# Patient Record
Sex: FEMALE | Race: WHITE | NOT HISPANIC OR LATINO | Employment: FULL TIME | URBAN - METROPOLITAN AREA
[De-identification: names, ages, dates, MRNs, and addresses within clinical notes are randomized per-mention and may not be internally consistent; named-entity substitution may affect disease eponyms.]

---

## 2018-09-05 ENCOUNTER — LAB (OUTPATIENT)
Dept: LAB | Facility: HOSPITAL | Age: 63
End: 2018-09-05

## 2018-09-05 ENCOUNTER — OFFICE VISIT (OUTPATIENT)
Dept: FAMILY MEDICINE CLINIC | Facility: CLINIC | Age: 63
End: 2018-09-05

## 2018-09-05 VITALS
DIASTOLIC BLOOD PRESSURE: 80 MMHG | BODY MASS INDEX: 26.77 KG/M2 | HEART RATE: 70 BPM | HEIGHT: 70 IN | SYSTOLIC BLOOD PRESSURE: 128 MMHG | OXYGEN SATURATION: 97 % | WEIGHT: 187 LBS

## 2018-09-05 DIAGNOSIS — Z83.2 FAMILY HISTORY OF AUTOIMMUNE DISORDER: ICD-10-CM

## 2018-09-05 DIAGNOSIS — D35.1 PARATHYROID ADENOMA: ICD-10-CM

## 2018-09-05 DIAGNOSIS — M25.512 ACUTE PAIN OF LEFT SHOULDER: ICD-10-CM

## 2018-09-05 DIAGNOSIS — D35.1 PARATHYROID ADENOMA: Primary | ICD-10-CM

## 2018-09-05 PROBLEM — R29.898 WEAKNESS OF SHOULDER: Status: ACTIVE | Noted: 2018-08-30

## 2018-09-05 PROBLEM — M25.519 SHOULDER PAIN: Status: ACTIVE | Noted: 2018-08-30

## 2018-09-05 PROBLEM — M75.02 ADHESIVE CAPSULITIS OF LEFT SHOULDER: Status: ACTIVE | Noted: 2018-08-30

## 2018-09-05 LAB
ALBUMIN SERPL-MCNC: 4.54 G/DL (ref 3.2–4.8)
ALBUMIN/GLOB SERPL: 1.8 G/DL (ref 1.5–2.5)
ALP SERPL-CCNC: 84 U/L (ref 25–100)
ALT SERPL W P-5'-P-CCNC: 19 U/L (ref 7–40)
ANION GAP SERPL CALCULATED.3IONS-SCNC: 7 MMOL/L (ref 3–11)
AST SERPL-CCNC: 12 U/L (ref 0–33)
BASOPHILS # BLD AUTO: 0.04 10*3/MM3 (ref 0–0.2)
BASOPHILS NFR BLD AUTO: 0.4 % (ref 0–1)
BILIRUB SERPL-MCNC: 0.5 MG/DL (ref 0.3–1.2)
BUN BLD-MCNC: 12 MG/DL (ref 9–23)
BUN/CREAT SERPL: 15.4 (ref 7–25)
CA-I SERPL ISE-MCNC: 1.33 MMOL/L (ref 1.12–1.32)
CALCIUM SPEC-SCNC: 9.3 MG/DL (ref 8.7–10.4)
CHLORIDE SERPL-SCNC: 105 MMOL/L (ref 99–109)
CO2 SERPL-SCNC: 29 MMOL/L (ref 20–31)
CREAT BLD-MCNC: 0.78 MG/DL (ref 0.6–1.3)
CRP SERPL-MCNC: 0.14 MG/DL (ref 0–1)
DEPRECATED RDW RBC AUTO: 43.6 FL (ref 37–54)
EOSINOPHIL # BLD AUTO: 0.21 10*3/MM3 (ref 0–0.3)
EOSINOPHIL NFR BLD AUTO: 2.4 % (ref 0–3)
ERYTHROCYTE [DISTWIDTH] IN BLOOD BY AUTOMATED COUNT: 13.4 % (ref 11.3–14.5)
ERYTHROCYTE [SEDIMENTATION RATE] IN BLOOD: 19 MM/HR (ref 0–30)
GFR SERPL CREATININE-BSD FRML MDRD: 75 ML/MIN/1.73
GLOBULIN UR ELPH-MCNC: 2.6 GM/DL
GLUCOSE BLD-MCNC: 106 MG/DL (ref 70–100)
HCT VFR BLD AUTO: 41.4 % (ref 34.5–44)
HGB BLD-MCNC: 13.7 G/DL (ref 11.5–15.5)
IMM GRANULOCYTES # BLD: 0.02 10*3/MM3 (ref 0–0.03)
IMM GRANULOCYTES NFR BLD: 0.2 % (ref 0–0.6)
LYMPHOCYTES # BLD AUTO: 2.95 10*3/MM3 (ref 0.6–4.8)
LYMPHOCYTES NFR BLD AUTO: 33 % (ref 24–44)
MCH RBC QN AUTO: 29.5 PG (ref 27–31)
MCHC RBC AUTO-ENTMCNC: 33.1 G/DL (ref 32–36)
MCV RBC AUTO: 89.2 FL (ref 80–99)
MONOCYTES # BLD AUTO: 0.65 10*3/MM3 (ref 0–1)
MONOCYTES NFR BLD AUTO: 7.3 % (ref 0–12)
NEUTROPHILS # BLD AUTO: 5.08 10*3/MM3 (ref 1.5–8.3)
NEUTROPHILS NFR BLD AUTO: 56.9 % (ref 41–71)
PLATELET # BLD AUTO: 361 10*3/MM3 (ref 150–450)
PMV BLD AUTO: 9.8 FL (ref 6–12)
POTASSIUM BLD-SCNC: 3.8 MMOL/L (ref 3.5–5.5)
PROT SERPL-MCNC: 7.1 G/DL (ref 5.7–8.2)
PTH-INTACT SERPL-MCNC: 57 PG/ML (ref 11–80)
RBC # BLD AUTO: 4.64 10*6/MM3 (ref 3.89–5.14)
SODIUM BLD-SCNC: 141 MMOL/L (ref 132–146)
WBC NRBC COR # BLD: 8.93 10*3/MM3 (ref 3.5–10.8)

## 2018-09-05 PROCEDURE — 85025 COMPLETE CBC W/AUTO DIFF WBC: CPT

## 2018-09-05 PROCEDURE — 99204 OFFICE O/P NEW MOD 45 MIN: CPT | Performed by: FAMILY MEDICINE

## 2018-09-05 PROCEDURE — 83970 ASSAY OF PARATHORMONE: CPT

## 2018-09-05 PROCEDURE — 86225 DNA ANTIBODY NATIVE: CPT

## 2018-09-05 PROCEDURE — 86140 C-REACTIVE PROTEIN: CPT

## 2018-09-05 PROCEDURE — 86431 RHEUMATOID FACTOR QUANT: CPT

## 2018-09-05 PROCEDURE — 80053 COMPREHEN METABOLIC PANEL: CPT

## 2018-09-05 PROCEDURE — 86235 NUCLEAR ANTIGEN ANTIBODY: CPT

## 2018-09-05 PROCEDURE — 36415 COLL VENOUS BLD VENIPUNCTURE: CPT

## 2018-09-05 PROCEDURE — 82330 ASSAY OF CALCIUM: CPT

## 2018-09-05 PROCEDURE — 86430 RHEUMATOID FACTOR TEST QUAL: CPT

## 2018-09-05 RX ORDER — ACYCLOVIR 200 MG/1
CAPSULE ORAL
COMMUNITY
Start: 2018-06-12 | End: 2021-12-15

## 2018-09-05 RX ORDER — SUMATRIPTAN 100 MG/1
100 TABLET, FILM COATED ORAL
COMMUNITY

## 2018-09-05 NOTE — PROGRESS NOTES
Shimon Reinoso presents today to establish care.    Chief Complaint   Patient presents with   • Establish Care     autoimmune check up, parathyroid tumor check, L shoulder pain         HPI   Parathyroid adenoma:  1 adenoma removed in 2011. Had joint pain for a long time, calcium was a little high. Saw rheumatologist and diagnosed osteoarthritis. Got a second opinion, found parathyroid tumor. Had nuclear scan done for hot spot. Dr. Ibrahima DEL REAL did surgery.     Shoulder pain:  Recently sister's have had positive WILLY. Past couple months pain in left shoulder. Goes to the gym, but having decreased ROM. Saw orthopedics last week and diagnosed frozen shoulder and autoimmune disease. Ordered PT, ice, antiinflammatory medication.     Hyperlipidemia:  Labs drawn at work this Spring and were ok. LDL and HDL bad but total cholesterol ok. Not currently on statin.     H/o blood clot:  2009 left greater saphenous vein. Due to Mima OCP. Took coumadin for 6 months.     Osteoporosis:  Around 10 years ago in right hip. Decided not to take medication due to risks/side effects.     Review of Systems   Constitutional: Negative.    HENT: Negative.    Eyes: Negative.    Respiratory: Negative.    Cardiovascular: Positive for leg swelling.   Gastrointestinal: Negative.    Endocrine: Negative.    Genitourinary: Negative.    Musculoskeletal: Positive for arthralgias and joint swelling.   Skin: Negative.    Neurological: Positive for headache.        Tingling BLE   Hematological: Negative.    Psychiatric/Behavioral: Negative.         Past Medical History:   Diagnosis Date   • Colon polyp    • History of blood clots 2009    left great saphenous, due to Mima OCP, coumadin x 6 months   • Hyperlipidemia    • Infectious viral hepatitis     A as a child    • Migraine headache    • Osteoporosis         Past Surgical History:   Procedure Laterality Date   • PARATHYROIDECTOMY  2011    adenoma        Family History   Problem Relation Age of Onset   •  "Hypertension Mother    • Hyperlipidemia Mother    • Migraines Mother    • Multiple myeloma Mother    • Arthritis Father    • Heart disease Father    • Hypertension Sister    • Autoimmune disease Sister         WILLY positive   • Heart attack Maternal Uncle    • Heart attack Paternal Uncle         Social History     Social History   • Marital status:      Spouse name: N/A   • Number of children: N/A   • Years of education: N/A     Occupational History   • nurse      [employer]     Social History Main Topics   • Smoking status: Never Smoker   • Smokeless tobacco: Never Used   • Alcohol use No   • Drug use: No   • Sexual activity: Defer     Other Topics Concern   • Not on file     Social History Narrative   • No narrative on file        Current Outpatient Prescriptions   Medication Sig Dispense Refill   • acyclovir (ZOVIRAX) 200 MG capsule PRN     • SUMAtriptan (IMITREX) 100 MG tablet Take 100 mg by mouth Every 2 (Two) Hours As Needed for Migraine.       No current facility-administered medications for this visit.        No Known Allergies     Visit Vitals  /80 (BP Location: Left arm, Patient Position: Sitting, Cuff Size: Adult)   Pulse 70   Ht 177.8 cm (70\")   Wt 84.8 kg (187 lb)   SpO2 97%   BMI 26.83 kg/m²        Physical Exam   Constitutional: She is oriented to person, place, and time. No distress.   HENT:   Nose: Nose normal.   Mouth/Throat: Oropharynx is clear and moist.   Eyes: Pupils are equal, round, and reactive to light. Conjunctivae are normal.   Neck: Neck supple. No thyromegaly present.   Cardiovascular: Normal rate, regular rhythm and intact distal pulses.    No murmur heard.  Pulses:       Posterior tibial pulses are 2+ on the right side, and 2+ on the left side.   Pulmonary/Chest: Effort normal and breath sounds normal.   Abdominal: Soft. There is no hepatosplenomegaly. There is no tenderness.   Musculoskeletal: She exhibits no edema.        Left shoulder: She exhibits decreased " range of motion.   Lymphadenopathy:     She has no cervical adenopathy.   Neurological: She is alert and oriented to person, place, and time.   Skin: Skin is warm and dry. No rash noted.   Psychiatric: She has a normal mood and affect. Her behavior is normal. Judgment and thought content normal.   Vitals reviewed.       No results found for this or any previous visit.     Shimon was seen today for establish care.    Diagnoses and all orders for this visit:  Prior PCP records requested.  Prior GYN records and Pap requested.  Parathyroid adenoma  -     Comprehensive Metabolic Panel; Future  -     Calcium, Ionized; Future  -     PTH, Intact; Future  Prior records requested.  Check labs today.  Family history of autoimmune disorder  -     CBC & Differential; Future  -     WILLY Comprehensive Panel; Future  -     Rheumatoid Factor; Future  -     Sedimentation rate, automated; Future  -     C-reactive protein; Future  Family history Sr. positive WILLY.  Patient is experiencing joint pain as well and orthopedic recommended autoimmune workup, labs ordered today.   Acute pain of left shoulder  -     CBC & Differential; Future  -     WILLY Comprehensive Panel; Future  -     Rheumatoid Factor; Future  -     Sedimentation rate, automated; Future  -     C-reactive protein; Future  Patient is under the care of orthopedics.  He recommended autoimmune workup, labs ordered as above.  Request prior records.      Return for If not improved.

## 2018-09-06 LAB
RHEUMATOID FACT SERPL-ACNC: POSITIVE [IU]/ML
RHEUMATOID FACT TITR SER: NORMAL IU/ML

## 2018-09-07 DIAGNOSIS — R76.8 RHEUMATOID FACTOR POSITIVE: Primary | ICD-10-CM

## 2018-09-07 LAB
CENTROMERE B AB SER-ACNC: <0.2 AI (ref 0–0.9)
CHROMATIN AB SERPL-ACNC: <0.2 AI (ref 0–0.9)
DSDNA AB SER-ACNC: <1 IU/ML (ref 0–9)
ENA JO1 AB SER-ACNC: <0.2 AI (ref 0–0.9)
ENA RNP AB SER-ACNC: 0.4 AI (ref 0–0.9)
ENA SCL70 AB SER-ACNC: <0.2 AI (ref 0–0.9)
ENA SM AB SER-ACNC: <0.2 AI (ref 0–0.9)
ENA SS-A AB SER-ACNC: <0.2 AI (ref 0–0.9)
ENA SS-B AB SER-ACNC: <0.2 AI (ref 0–0.9)
Lab: NORMAL

## 2018-09-10 ENCOUNTER — TELEPHONE (OUTPATIENT)
Dept: FAMILY MEDICINE CLINIC | Facility: CLINIC | Age: 63
End: 2018-09-10

## 2018-09-10 NOTE — TELEPHONE ENCOUNTER
WILLY Comprehensive Panel   Order: [id number]   Status:  Final result   Visible to patient:  Yes (MyChart) Dx:  Family history of autoimmune disorder...   Notes recorded by Radha Montez MA on 9/10/2018 at 11:31 AM EDT  LVM for pt to call back.  ------    Notes recorded by Jazmyne Gordon MD on 9/7/2018 at 1:54 PM EDT  The test results show positive for rheumatoid factor.  I recommend further evaluation with a rheumatologist and I have placed a referral.  Your ionized calcium was slightly out of range and normal calcium on the CMP.  PTH is in the normal range

## 2020-05-12 ENCOUNTER — OFFICE VISIT (OUTPATIENT)
Dept: PREADMISSION TESTING | Facility: HOSPITAL | Age: 65
End: 2020-05-12

## 2020-05-12 PROCEDURE — U0002 COVID-19 LAB TEST NON-CDC: HCPCS | Performed by: INTERNAL MEDICINE

## 2020-05-12 PROCEDURE — U0004 COV-19 TEST NON-CDC HGH THRU: HCPCS | Performed by: INTERNAL MEDICINE

## 2020-05-13 LAB
REF LAB TEST METHOD: NORMAL
SARS-COV-2 RNA RESP QL NAA+PROBE: NOT DETECTED

## 2020-05-14 ENCOUNTER — OUTSIDE FACILITY SERVICE (OUTPATIENT)
Dept: GASTROENTEROLOGY | Facility: CLINIC | Age: 65
End: 2020-05-14

## 2020-05-14 PROCEDURE — 45388 COLONOSCOPY W/ABLATION: CPT | Performed by: INTERNAL MEDICINE

## 2021-08-10 ENCOUNTER — TRANSCRIBE ORDERS (OUTPATIENT)
Dept: PHYSICAL THERAPY | Facility: CLINIC | Age: 66
End: 2021-08-10

## 2021-08-10 DIAGNOSIS — M53.3 CHRONIC LEFT SI JOINT PAIN: ICD-10-CM

## 2021-08-10 DIAGNOSIS — G89.29 CHRONIC LEFT SI JOINT PAIN: ICD-10-CM

## 2021-08-10 DIAGNOSIS — M54.50 LUMBOSACRAL PAIN: Primary | ICD-10-CM

## 2021-08-10 DIAGNOSIS — M54.32 LEFT SIDED SCIATICA: ICD-10-CM

## 2021-08-19 ENCOUNTER — TREATMENT (OUTPATIENT)
Dept: PHYSICAL THERAPY | Facility: CLINIC | Age: 66
End: 2021-08-19

## 2021-08-19 DIAGNOSIS — M54.42 ACUTE LEFT-SIDED LOW BACK PAIN WITH LEFT-SIDED SCIATICA: Primary | ICD-10-CM

## 2021-08-19 PROCEDURE — 97140 MANUAL THERAPY 1/> REGIONS: CPT | Performed by: PHYSICAL THERAPIST

## 2021-08-19 PROCEDURE — 97162 PT EVAL MOD COMPLEX 30 MIN: CPT | Performed by: PHYSICAL THERAPIST

## 2021-08-19 PROCEDURE — 97110 THERAPEUTIC EXERCISES: CPT | Performed by: PHYSICAL THERAPIST

## 2021-08-19 PROCEDURE — 97112 NEUROMUSCULAR REEDUCATION: CPT | Performed by: PHYSICAL THERAPIST

## 2021-08-19 NOTE — PROGRESS NOTES
Physical Therapy Initial Evaluation and Plan of Care    TOTAL TIME: 75 MINUTES    Subjective Evaluation    History of Present Illness  Date of onset: 2021  Mechanism of injury: Works as Director of Nursing at [employer]    300+ pound man that was in a car, was helping 2 men get him out; got in back seat and was trying to help push him out from behind; then got out of car and helped him transfer; 'he was dead weight'     Injured during the pull/twist part of the transfer; felt burning ; started hurting in left hip and leg down to the knee ; toes were numb intermittently ; not having the spasms in the back anymore     Had State [employer]ors in the building that week, so could not come and see the doctor; by Friday night it had gotten really bad when trying to sleep    Got Mobic and Flexeril (not taking anymore)      Patient Occupation: Director of Nursing Pain  Current pain ratin  Location: low back, left hip  Quality: dull ache and discomfort  Relieving factors: change in position, heat and medications  Aggravating factors: movement, lifting, prolonged positioning, standing, ambulation, squatting, sleeping and stairs    Patient Goals  Patient goals for therapy: increased motion, decreased pain, increased strength, independence with ADLs/IADLs, return to sport/leisure activities and return to work             Objective        Special Questions      Additional Special Questions  Denies red flags      Postural Observations  Seated posture: fair  Standing posture: fair  Correction of posture: has no consistent effect    Additional Postural Observation Details  'pain is constant' in left PSIS area, buttocks, side of left hip  Patient denies directional preference  Pain with extended time in all positions    Palpation   Left   Tenderness of the lumbar paraspinals and quadratus lumborum.     Additional Palpation Details  TTP lower left thoracic paraspinals      Tenderness     Lumbar Spine  Tenderness in  "the facet joint. No tenderness in the spinous process.     Left Hip   Tenderness in the PSIS. No tenderness in the ASIS.     Right Hip   No tenderness in the ASIS and PSIS.     Neurological Testing     Sensation     Lumbar   Left   Intact: light touch    Right   Intact: light touch    Reflexes   Left   Patellar (L4): normal (2+)  Achilles (S1): normal (2+)    Right   Patellar (L4): normal (2+)  Achilles (S1): normal (2+)    Active Range of Motion     Lumbar   Flexion: Active lumbar flexion: 6\" from floor, pain upon extending back up. WFL and with pain  Extension: WFL  Left lateral flexion: WFL  Right lateral flexion: WFL  Left rotation: WFL  Right rotation: WFL    Additional Active Range of Motion Details  No increase from the constant 4/10 pain she has at rest    Strength/Myotome Testing     Lumbar   Left   Normal strength    Right   Normal strength    Tests     Lumbar     Left   Negative passive SLR and quadrant.     Right   Negative passive SLR and quadrant.     Left Pelvic Girdle/Sacrum   Negative: sacrum compression, gapping and sacral spring.     Right Pelvic Girdle/Sacrum   Negative: sacrum compression, gapping and sacral spring.     Additional Tests Details  No pelvic asymmetry noted     + Mary's left side           Assessment & Plan     Assessment  Impairments: abnormal or restricted ROM, activity intolerance, lacks appropriate home exercise program and pain with function  Assessment details: 66 yo presents with s/s of left lumbar strain after a lift / twist injury in which she was assisting to transfer a heavy patient from car to wheelchair ; she has improved since the time of injury; states she had a lot of spasms initially but has improved; she has complaints of constant, moderate pain in left lower back and SI joints; no directional preference noted today; initiated HEP for lumbar mobility / flexibility ; should respond well to PT for manual therapy, modalities prn and therapeutic exercise to improve " functional mobility and endurance in order to RTW on full duty without pain or dysfunction  Prognosis: fair  Functional Limitations: carrying objects, lifting, sleeping, walking, pulling, pushing, uncomfortable because of pain, moving in bed, sitting, standing and stooping  Goals  Plan Goals: 4 weeks:  1. IND with HEP  2. Patient to display pain-free ROM of lumbar spine without compensation  3. Patient to improve TAMIA by 1 MCID  4. Patient to c/o pain no > 2/10 at worst and no tenderness to palpate    Plan  Therapy options: will be seen for skilled physical therapy services  Planned modality interventions: iontophoresis, TENS, thermotherapy (hydrocollator packs), traction, ultrasound, high voltage pulsed current (pain management), electrical stimulation/Russian stimulation, dry needling and cryotherapy  Planned therapy interventions: manual therapy, neuromuscular re-education, soft tissue mobilization, spinal/joint mobilization, strengthening, stretching, therapeutic activities, joint mobilization, home exercise program, functional ROM exercises, flexibility and body mechanics training  Frequency: 2x week  Duration in visits: 8  Treatment plan discussed with: patient         Access Code: [email or web address]  URL: [email or web address]  Date: 08/19/2021  Prepared by: Pranav Ferraro    Exercises  Supine Lower Trunk Rotation - 2 x daily - 7 x weekly - 3 sets - 10 reps  Supine Posterior Pelvic Tilt - 2 x daily - 7 x weekly - 3 sets - 10 reps  Supine Piriformis Stretch with Foot on Ground - 2 x daily - 7 x weekly - 1 sets - 10 reps - 10 seconds hold  Supine Single Knee to Chest Stretch - 2 x daily - 7 x weekly - 1 sets - 10 reps - 10 second hold  Supine Lumbar Rotation Stretch - 2 x daily - 7 x weekly - 1 sets - 10 reps - 10 seconds hold  Prone on Elbows Stretch - 2 x daily - 7 x weekly - 1 sets - 10 reps - 10 seconds hold  Seated Quadratus Lumborum Stretch with Arm Overhead - 2 x daily - 7 x weekly - 1 sets - 10 reps - 10  seconds hold  Standing Quadratus Lumborum Stretch with Doorway - 2 x daily - 7 x weekly - 1 sets - 10 reps - 10 seconds hold    Manual Therapy:    10     mins  18958;  Therapeutic Exercise:    15     mins  39050;     Neuromuscular Baljinder:    10    mins  32780;    Therapeutic Activity:          mins  20731;     Gait Training:           mins  18855;     Ultrasound:          mins  85363;    Electrical Stimulation:    15     mins  11922 ( );  Dry Needling          mins self-pay    Timed Treatment:   50   mins   Total Treatment:     75   mins    PT SIGNATURE: GERONIMO eFrraro, PT   DATE TREATMENT INITIATED: 8/19/2021    Initial Certification  Certification Period: 11/17/2021  I certify that the therapy services are furnished while this patient is under my care.  The services outlined above are required by this patient, and will be reviewed every 90 days.     PHYSICIAN: Dudley Dela Cruz MD      DATE:     Please sign and return via fax to  .. Thank you, [facility].

## 2021-08-26 ENCOUNTER — TREATMENT (OUTPATIENT)
Dept: PHYSICAL THERAPY | Facility: CLINIC | Age: 66
End: 2021-08-26

## 2021-08-26 DIAGNOSIS — M54.42 ACUTE LEFT-SIDED LOW BACK PAIN WITH LEFT-SIDED SCIATICA: Primary | ICD-10-CM

## 2021-08-26 PROCEDURE — 97112 NEUROMUSCULAR REEDUCATION: CPT | Performed by: PHYSICAL THERAPIST

## 2021-08-26 PROCEDURE — 97140 MANUAL THERAPY 1/> REGIONS: CPT | Performed by: PHYSICAL THERAPIST

## 2021-08-26 PROCEDURE — 97014 ELECTRIC STIMULATION THERAPY: CPT | Performed by: PHYSICAL THERAPIST

## 2021-08-26 PROCEDURE — 97110 THERAPEUTIC EXERCISES: CPT | Performed by: PHYSICAL THERAPIST

## 2021-08-26 NOTE — PROGRESS NOTES
Physical Therapy Daily Progress Note    TOTAL TIME: 60 MINUTES    Shimon Reinoso reports: thoracic and lumbar spine doing better, pain is mostly now in my left hip and down my outer thigh, feels deep      Objective   See Exercise, Manual, and Modality Logs for complete treatment.     THERAPEUTIC EXERCISES/ACTIVITIES ADDED TODAY: sidelying over bolster for positional traction, supine clams with green tband strap, bridges, piriformis stx; see previous hep    Tenderness to palpate: left greater trochanter, glute medius, proximal ITB    Manual: side lying gapping over bolster with QL stx, foam roller to left lateral thigh, piriformis, glute med    ROM: improved L/T AROM to WFL all planes without pain    IFC estim to left hip/lateral thigh, with moist heat, in right side lying position x 15 minutes    Assessment/Plan  Patient has improved since the initial evaluation; improved ROM, no tenderness to palpate the L/T spine or p/s mm; pain primarily located in left lateral hip; pain with some ER passive stx, tolerated loading ER's with clams without pain; may benefit from DN to lateral hip at next visit if still painful    Progress per Plan of Care           Manual Therapy:    15     mins  70236;  Therapeutic Exercise:    15     mins  08052;     Neuromuscular Baljinder:    15    mins  99733;    Therapeutic Activity:          mins  46438;     Gait Training:           mins  65596;     Ultrasound:          mins  61423;    Electrical Stimulation:    15     mins  44443 ( );  Dry Needling          mins self-pay    Timed Treatment:   60   mins   Total Treatment:     60   mins    GERONIMO Ferraro, PT  Physical Therapist  
115

## 2021-09-02 ENCOUNTER — TELEPHONE (OUTPATIENT)
Dept: PHYSICAL THERAPY | Facility: CLINIC | Age: 66
End: 2021-09-02

## 2021-12-07 ENCOUNTER — TRANSCRIBE ORDERS (OUTPATIENT)
Dept: ADMINISTRATIVE | Facility: HOSPITAL | Age: 66
End: 2021-12-07

## 2021-12-07 DIAGNOSIS — R07.9 CHEST PAIN, UNSPECIFIED TYPE: Primary | ICD-10-CM

## 2021-12-15 ENCOUNTER — OFFICE VISIT (OUTPATIENT)
Dept: CARDIOLOGY | Facility: CLINIC | Age: 66
End: 2021-12-15

## 2021-12-15 VITALS
DIASTOLIC BLOOD PRESSURE: 78 MMHG | OXYGEN SATURATION: 99 % | WEIGHT: 189 LBS | SYSTOLIC BLOOD PRESSURE: 112 MMHG | HEART RATE: 63 BPM | HEIGHT: 71 IN | BODY MASS INDEX: 26.46 KG/M2

## 2021-12-15 DIAGNOSIS — R07.89 CHEST PAIN, ATYPICAL: Primary | ICD-10-CM

## 2021-12-15 DIAGNOSIS — R94.31 ABNORMAL EKG: ICD-10-CM

## 2021-12-15 DIAGNOSIS — R06.02 SOB (SHORTNESS OF BREATH): ICD-10-CM

## 2021-12-15 DIAGNOSIS — E78.2 MIXED HYPERLIPIDEMIA: ICD-10-CM

## 2021-12-15 PROCEDURE — 99203 OFFICE O/P NEW LOW 30 MIN: CPT | Performed by: PHYSICIAN ASSISTANT

## 2021-12-15 PROCEDURE — 93000 ELECTROCARDIOGRAM COMPLETE: CPT | Performed by: PHYSICIAN ASSISTANT

## 2021-12-15 RX ORDER — TURMERIC ROOT EXTRACT 500 MG
500 TABLET ORAL DAILY
COMMUNITY

## 2021-12-15 RX ORDER — MULTIPLE VITAMINS W/ MINERALS TAB 9MG-400MCG
1 TAB ORAL DAILY
COMMUNITY

## 2021-12-15 RX ORDER — GINKGO BILOBA LEAF EXTRACT 60 MG
100 CAPSULE ORAL DAILY
COMMUNITY

## 2021-12-15 RX ORDER — CHLORAL HYDRATE 500 MG
CAPSULE ORAL
COMMUNITY

## 2021-12-15 RX ORDER — OMEPRAZOLE 40 MG/1
40 CAPSULE, DELAYED RELEASE ORAL AS NEEDED
COMMUNITY

## 2021-12-15 RX ORDER — MELATONIN
1000 DAILY
COMMUNITY

## 2021-12-15 NOTE — PROGRESS NOTES
[facility]  INITIAL OFFICE CONSULT      Shimon Reinoso  1955  PCP: Jazmyne Gordon MD    SUBJECTIVE:   Shimon Reinoso is a 66 y.o. female seen for a consultation visit regarding the following:     Chief Complaint:   Chief Complaint   Patient presents with   • Chest Pain   • Irregular Heart Beat        Consultation is requested by Guillaume Donaldson MD for evaluation of Chest Pain and Irregular Heart Beat      History:  Pleasant 66-year-old female presents today for consultation at the request of her primary care provider regarding chest pain.  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  In her late 20s she was diagnosed pericarditis by Dr. Guillaume Pina.  She also had atypical chest pain dating back she thinks about 2012- 2013 she is admitted to [employer] [facility] for 3-4 stay with the extensive work-up for her chest pain including negative CT of the chest, negative stress test and normal echocardiogram.  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  She still works a significant amount but is less active she does not exercise on regular basis she is gained some weight.  She has not any difficulties hypertension she does have dyslipidemia but has not started a statin.  She does have a strong family history of heart disease.  Recently the past several months she has had a return of her chest pain.  Somewhat different than previous chest pain this is more of a substernal region just above her epigastric area symptoms radiates through to her back.  This is not exertional in nature.  Last about 15 minutes.  Is not associatea any other concomitant symptoms such as nausea, diaphoresis dizziness near syncope or syncope.  He does have occasional palpitations but these are not overly bothersome to her.  She recently lost a close friend to cardiac arrest she has some  significant stress anxiety with work and upcoming intermediate.  She feels some this may be causing some of her chest pain but she would like to have further cardiac evaluation to rule out this possibly due to her heart.      Cardiac PMH: (Old records have been reviewed and summarized below)  1. Chest pain  a. Remote Pericarditis age 28  b. [facility] Hospitalization 2013-Negative CTA of chest, Negative Stress test and Echo.  c. Recurrent Chest pain, EKg with nonspecific ST-T wave abnormalities..   2. HLD  3. GERD-Prilosec  4. OA, Osteoporosis  5. Parathyroidectomy 2011      Past Medical History, Past Surgical History, Family history, Social History, and Medications were all reviewed with the patient today and updated as necessary.     Current Outpatient Medications   Medication Sig Dispense Refill   • cholecalciferol (VITAMIN D3) 25 MCG (1000 UT) tablet Take 1,000 Units by mouth Daily.     • Ginseng 100 MG capsule Take 100 mg by mouth Daily.     • multivitamin with minerals tablet tablet Take 1 tablet by mouth Daily.     • Omega-3 Fatty Acids (fish oil) 1000 MG capsule capsule Take  by mouth Daily With Breakfast.     • omeprazole (priLOSEC) 40 MG capsule Take 40 mg by mouth As Needed.     • SUMAtriptan (IMITREX) 100 MG tablet Take 100 mg by mouth Every 2 (Two) Hours As Needed for Migraine.     • Turmeric 500 MG tablet Take 500 mg by mouth Daily.       No current facility-administered medications for this visit.     No Known Allergies      Past Medical History:   Diagnosis Date   • Abnormal ECG    • Colon polyp    • Diverticulosis    • Frozen shoulder    • History of blood clots 2009    left great saphenous, due to Mima OCP, coumadin x 6 months   • Hyperlipidemia    • Infectious viral hepatitis     A as a child    • Injury of back    • Migraine headache    • Osteoporosis    • Rheumatoid factor positive      Past Surgical History:   Procedure Laterality Date   • PARATHYROIDECTOMY  2011    adenoma     Family History   Problem  "Relation Age of Onset   • Hypertension Mother    • Hyperlipidemia Mother    • Migraines Mother    • Multiple myeloma Mother    • Arthritis Father    • Heart disease Father    • Hypertension Sister    • Autoimmune disease Sister         WILLY positive   • Heart attack Maternal Uncle    • Heart attack Paternal Uncle      Social History     Tobacco Use   • Smoking status: Never Smoker   • Smokeless tobacco: Never Used   Substance Use Topics   • Alcohol use: Not Currently       ROS:  Review of Symptoms:  General: no recent weight loss/gain, weakness or fatigue  Skin: no rashes, lumps, or other skin changes  HEENT: no dizziness, lightheadedness, or vision changes  Respiratory: no cough or hemoptysis  Cardiovascular: no palpitations, and tachycardia  Gastrointestinal: no black/tarry stools or diarrhea  Urinary: no change in frequency or urgency  Peripheral Vascular: no claudication or leg cramps  Musculoskeletal: + Osteoarthritis  Psychiatric: no depression or excessive stress  Neurological: no sensory or motor loss, no syncope  Hematologic: no anemia, easy bruising or bleeding  Endocrine: no thyroid problems, nor heat or cold intolerance         PHYSICAL EXAM:   /78 (BP Location: Left arm, Patient Position: Sitting)   Pulse 63   Ht 180.3 cm (71\")   Wt 85.7 kg (189 lb)   SpO2 99%   BMI 26.36 kg/m²      Wt Readings from Last 5 Encounters:   12/15/21 85.7 kg (189 lb)   09/05/18 84.8 kg (187 lb)     BP Readings from Last 5 Encounters:   12/15/21 112/78   09/05/18 128/80       General-Well Nourished, Well developed  Eyes - PERRLA  Neck- supple, No mass  CV- regular rate and rhythm, no MRG  Lung- clear bilaterally  Abd- soft, +BS  Musc/skel - Norm strength and range of motion  Skin- warm and dry  Neuro - Alert & Oriented x 3, appropriate mood.    Patient's external notes were reviewed.  Independent interpretation of test performed by another physician in facility were reviewed.  Outside laboratory data was also " "reviewed.    Medical problems and test results were reviewed with the patient today.     Results for orders placed or performed in visit on 05/12/20   CORONAVIRUS (COVID-19),RT-PCR,[facility], NP SWAB IN [facility] - Swab, Nasopharynx    Specimen: Nasopharynx; Swab   Result Value Ref Range    Reference Lab Report      COVID19 Not Detected Not Detected - Ref. Range         No results found for: CHOL, HDL, HDLC, LDL, LDLC, VLDL    EKG:  (EKG/Tracing has been independently visualized by me and summarized below)      ECG 12 Lead    Date/Time: 12/15/2021 9:50 AM  Performed by: Gil Lopes PA  Authorized by: Gil Lopes PA   Rhythm: sinus rhythm  Rate: normal  Conduction: conduction normal  Other findings: non-specific ST-T wave changes    Clinical impression: abnormal EKG            ASSESSMENT   1. Chest pain, abnormal EKG with nonspecific ST of abnormalities  2. Remote Pericarditis  3. HLD: Discussed the patient option of initiating statin she would like to \"think about it\" at this time.  4. Family History of CAD.   5. GERD   6. Palpitations: Rare nature at this point offered her the option of wearing a monitor to rule any other significant rhythm and she declines at this time.      PLAN  · Stress MPS  · Echocardiogram  · Follow up FLP, Consider Statin   · Follow up one month         Cardiology/Electrophysiology  12/15/21  12:46 EST  Will Marianne TOPETE  "

## 2021-12-28 ENCOUNTER — APPOINTMENT (OUTPATIENT)
Dept: CARDIOLOGY | Facility: HOSPITAL | Age: 66
End: 2021-12-28

## 2022-01-20 ENCOUNTER — HOSPITAL ENCOUNTER (OUTPATIENT)
Dept: CARDIOLOGY | Facility: HOSPITAL | Age: 67
End: 2022-01-20

## 2022-01-20 ENCOUNTER — APPOINTMENT (OUTPATIENT)
Dept: CARDIOLOGY | Facility: HOSPITAL | Age: 67
End: 2022-01-20